# Patient Record
Sex: MALE
[De-identification: names, ages, dates, MRNs, and addresses within clinical notes are randomized per-mention and may not be internally consistent; named-entity substitution may affect disease eponyms.]

---

## 2023-03-17 PROBLEM — Z00.00 ENCOUNTER FOR PREVENTIVE HEALTH EXAMINATION: Status: ACTIVE | Noted: 2023-03-17

## 2023-04-03 ENCOUNTER — APPOINTMENT (OUTPATIENT)
Dept: UROLOGY | Facility: CLINIC | Age: 63
End: 2023-04-03

## 2023-04-24 ENCOUNTER — APPOINTMENT (OUTPATIENT)
Dept: UROLOGY | Facility: CLINIC | Age: 63
End: 2023-04-24
Payer: COMMERCIAL

## 2023-04-24 VITALS
SYSTOLIC BLOOD PRESSURE: 170 MMHG | HEART RATE: 60 BPM | DIASTOLIC BLOOD PRESSURE: 81 MMHG | HEIGHT: 67 IN | WEIGHT: 162 LBS | BODY MASS INDEX: 25.43 KG/M2 | RESPIRATION RATE: 16 BRPM

## 2023-04-24 DIAGNOSIS — I25.2 OLD MYOCARDIAL INFARCTION: ICD-10-CM

## 2023-04-24 DIAGNOSIS — E78.5 HYPERLIPIDEMIA, UNSPECIFIED: ICD-10-CM

## 2023-04-24 DIAGNOSIS — Z80.1 FAMILY HISTORY OF MALIGNANT NEOPLASM OF TRACHEA, BRONCHUS AND LUNG: ICD-10-CM

## 2023-04-24 DIAGNOSIS — N52.01 ERECTILE DYSFUNCTION DUE TO ARTERIAL INSUFFICIENCY: ICD-10-CM

## 2023-04-24 PROCEDURE — 99204 OFFICE O/P NEW MOD 45 MIN: CPT

## 2023-04-25 PROBLEM — I25.2 HISTORY OF MYOCARDIAL INFARCTION: Status: RESOLVED | Noted: 2023-04-25 | Resolved: 2023-04-25

## 2023-04-25 PROBLEM — Z80.1 FAMILY HISTORY OF LUNG CANCER: Status: ACTIVE | Noted: 2023-04-25

## 2023-04-25 PROBLEM — E78.5 HYPERLIPIDEMIA: Status: ACTIVE | Noted: 2023-04-25

## 2023-04-25 PROBLEM — N52.01 ERECTILE DYSFUNCTION DUE TO ARTERIAL INSUFFICIENCY: Status: ACTIVE | Noted: 2023-04-25

## 2023-04-25 RX ORDER — TADALAFIL 2.5 MG/1
TABLET, FILM COATED ORAL
Refills: 0 | Status: ACTIVE | COMMUNITY

## 2023-04-25 RX ORDER — ROSUVASTATIN CALCIUM 10 MG/1
10 TABLET, FILM COATED ORAL
Refills: 0 | Status: ACTIVE | COMMUNITY

## 2023-04-25 RX ORDER — PRASUGREL 10 MG/1
10 TABLET, FILM COATED ORAL
Refills: 0 | Status: ACTIVE | COMMUNITY

## 2023-04-25 NOTE — DISEASE MANAGEMENT
[1] : T1 [Biopsy] : Patient had a biopsy on [6] : Template Biopsy Brown Score: 6 [Biopsy results sent to PCP/Referring Physician] : Biopsy results sent to PCP/Referring Physician [] : Patient had no Prostate MRI performed [BiopsyDate] : 09/2021 [TotalCores] : 12 [TotalPositiveCores] : 2 [MaxCoreInvolvement] : 90% [I] : I [Active Surveillance] : Active Surveillance [LastPSADate] : 11/1/2022 [LastPSAResults] : 5.97 ng/mL [LastIDate] : 1/3/2023 [LastMRIResults] : 22 mL prostate volume, PIRAD 4 Right PZpl

## 2023-04-25 NOTE — HISTORY OF PRESENT ILLNESS
[FreeTextEntry1] : Referred by Dr. Menendez for fusion prostate biopsy.\par Diagnosed with Gr 1 prostate cancer in Sept 2021.  Currently on active surveillance.\par MRI prostate January 2023 demonstrated PIRAD 4 lesion.\par

## 2023-05-05 ENCOUNTER — NON-APPOINTMENT (OUTPATIENT)
Age: 63
End: 2023-05-05

## 2023-05-12 LAB
PSA FREE FLD-MCNC: 17 %
PSA FREE SERPL-MCNC: 1.2 NG/ML
PSA SERPL-MCNC: 6.91 NG/ML

## 2023-05-16 ENCOUNTER — APPOINTMENT (OUTPATIENT)
Dept: UROLOGY | Facility: CLINIC | Age: 63
End: 2023-05-16

## 2023-05-24 ENCOUNTER — NON-APPOINTMENT (OUTPATIENT)
Age: 63
End: 2023-05-24

## 2023-06-06 ENCOUNTER — OUTPATIENT (OUTPATIENT)
Dept: OUTPATIENT SERVICES | Facility: HOSPITAL | Age: 63
LOS: 1 days | End: 2023-06-06
Payer: COMMERCIAL

## 2023-06-06 ENCOUNTER — APPOINTMENT (OUTPATIENT)
Dept: UROLOGY | Facility: CLINIC | Age: 63
End: 2023-06-06
Payer: COMMERCIAL

## 2023-06-06 VITALS
TEMPERATURE: 97.5 F | HEART RATE: 56 BPM | RESPIRATION RATE: 16 BRPM | SYSTOLIC BLOOD PRESSURE: 158 MMHG | DIASTOLIC BLOOD PRESSURE: 81 MMHG

## 2023-06-06 DIAGNOSIS — R35.0 FREQUENCY OF MICTURITION: ICD-10-CM

## 2023-06-06 PROCEDURE — 76942 ECHO GUIDE FOR BIOPSY: CPT | Mod: 26,59

## 2023-06-06 PROCEDURE — 76377 3D RENDER W/INTRP POSTPROCES: CPT | Mod: 26

## 2023-06-06 PROCEDURE — 76942 ECHO GUIDE FOR BIOPSY: CPT

## 2023-06-06 PROCEDURE — 55700: CPT | Mod: 22

## 2023-06-06 PROCEDURE — 55700: CPT

## 2023-06-07 ENCOUNTER — NON-APPOINTMENT (OUTPATIENT)
Age: 63
End: 2023-06-07

## 2023-06-13 ENCOUNTER — NON-APPOINTMENT (OUTPATIENT)
Age: 63
End: 2023-06-13

## 2023-06-19 DIAGNOSIS — C61 MALIGNANT NEOPLASM OF PROSTATE: ICD-10-CM

## 2023-06-21 LAB — PROSTATE BIOPSY: NORMAL

## 2023-07-03 ENCOUNTER — OUTPATIENT (OUTPATIENT)
Dept: OUTPATIENT SERVICES | Facility: HOSPITAL | Age: 63
LOS: 1 days | Discharge: HOME | End: 2023-07-03
Payer: COMMERCIAL

## 2023-07-04 RX ORDER — DIAZEPAM 5 MG/1
5 TABLET ORAL
Qty: 1 | Refills: 0 | Status: DISCONTINUED | COMMUNITY
Start: 2023-05-05 | End: 2023-07-04

## 2023-07-06 ENCOUNTER — NON-APPOINTMENT (OUTPATIENT)
Age: 63
End: 2023-07-06

## 2023-07-06 ENCOUNTER — APPOINTMENT (OUTPATIENT)
Dept: RADIATION ONCOLOGY | Facility: CLINIC | Age: 63
End: 2023-07-06
Payer: COMMERCIAL

## 2023-07-06 VITALS
HEART RATE: 53 BPM | BODY MASS INDEX: 25.37 KG/M2 | WEIGHT: 162 LBS | SYSTOLIC BLOOD PRESSURE: 160 MMHG | OXYGEN SATURATION: 100 % | TEMPERATURE: 97.52 F | DIASTOLIC BLOOD PRESSURE: 74 MMHG | RESPIRATION RATE: 16 BRPM

## 2023-07-06 DIAGNOSIS — Z80.42 FAMILY HISTORY OF MALIGNANT NEOPLASM OF PROSTATE: ICD-10-CM

## 2023-07-06 DIAGNOSIS — U07.1 COVID-19: ICD-10-CM

## 2023-07-06 DIAGNOSIS — Z78.9 OTHER SPECIFIED HEALTH STATUS: ICD-10-CM

## 2023-07-06 DIAGNOSIS — Z23 ENCOUNTER FOR IMMUNIZATION: ICD-10-CM

## 2023-07-06 PROCEDURE — 99204 OFFICE O/P NEW MOD 45 MIN: CPT | Mod: 25

## 2023-07-06 NOTE — VITALS
[Maximal Pain Intensity: 0/10] : 0/10 [Least Pain Intensity: 0/10] : 0/10 [NoTreatment Scheduled] : no treatment scheduled [90: Able to carry normal activity; minor signs or symptoms of disease.] : 90: Able to carry normal activity; minor signs or symptoms of disease.  [ECOG Performance Status: 0 - Fully active, able to carry on all pre-disease performance without restriction] : Performance Status: 0 - Fully active, able to carry on all pre-disease performance without restriction [Date: ____________] : Patient's last distress assessment performed on [unfilled]. [0 - No Distress] : Distress Level: 0 [Patient given social work contact information and resource sheet] : Patient was given social work contact information and resource sheet

## 2023-07-06 NOTE — PHYSICAL EXAM
[Sclera] : the sclera and conjunctiva were normal [Outer Ear] : the ears and nose were normal in appearance [Normal External Genitalia] : normal external genitalia without lesions [Normal] : oriented to person, place and time, the affect was normal, the mood was normal and not anxious

## 2023-07-24 ENCOUNTER — APPOINTMENT (OUTPATIENT)
Dept: UROLOGY | Facility: CLINIC | Age: 63
End: 2023-07-24
Payer: COMMERCIAL

## 2023-07-24 VITALS
HEART RATE: 53 BPM | HEIGHT: 67 IN | RESPIRATION RATE: 16 BRPM | SYSTOLIC BLOOD PRESSURE: 150 MMHG | WEIGHT: 163 LBS | DIASTOLIC BLOOD PRESSURE: 66 MMHG | BODY MASS INDEX: 25.58 KG/M2

## 2023-07-24 PROCEDURE — 99214 OFFICE O/P EST MOD 30 MIN: CPT

## 2023-07-24 NOTE — HISTORY OF PRESENT ILLNESS
[FreeTextEntry1] : Urinary function: Stream is good, denies any increased frequency or urgency. Denies any hematuria, dysuria or incontinence. \par \par Sexual function: Using Tadalafil occasionally\par \par History of PPM placed 2012, history of cardiac stents last stent about 5 years ago. Currently on Prasugrel

## 2023-07-24 NOTE — DISEASE MANAGEMENT
[0-10] : 0 -10 ng/mL [Biopsy with Fusion] : Patient had a biopsy with fusion on [7(3+4)] : Fusion Biopsy [place] Score: 7(3+4) [] : Patient had a Prostate MRI [4] : 4 [1] : T1 [c] : c [0] : N0 [X] : MX [Biopsy results sent to PCP/Referring Physician] : Biopsy results sent to PCP/Referring Physician [BiopsyDate] : 6/6/23 [MeasuredProstateVolume] : 22 [TotalCores] : 13 [TotalPositiveCores] : 7 [MaxCoreInvolvement] : 90 [IIB] : IIB

## 2023-07-31 ENCOUNTER — NON-APPOINTMENT (OUTPATIENT)
Age: 63
End: 2023-07-31

## 2023-08-04 ENCOUNTER — NON-APPOINTMENT (OUTPATIENT)
Age: 63
End: 2023-08-04

## 2023-08-04 ENCOUNTER — APPOINTMENT (OUTPATIENT)
Dept: NUCLEAR MEDICINE | Facility: IMAGING CENTER | Age: 63
End: 2023-08-04
Payer: COMMERCIAL

## 2023-08-04 ENCOUNTER — OUTPATIENT (OUTPATIENT)
Dept: OUTPATIENT SERVICES | Facility: HOSPITAL | Age: 63
LOS: 1 days | End: 2023-08-04
Payer: COMMERCIAL

## 2023-08-04 DIAGNOSIS — C61 MALIGNANT NEOPLASM OF PROSTATE: ICD-10-CM

## 2023-08-04 PROCEDURE — A9561: CPT

## 2023-08-04 PROCEDURE — 78306 BONE IMAGING WHOLE BODY: CPT | Mod: 26

## 2023-08-04 PROCEDURE — 78306 BONE IMAGING WHOLE BODY: CPT

## 2023-08-09 ENCOUNTER — NON-APPOINTMENT (OUTPATIENT)
Age: 63
End: 2023-08-09

## 2023-08-11 ENCOUNTER — NON-APPOINTMENT (OUTPATIENT)
Age: 63
End: 2023-08-11

## 2023-08-15 PROCEDURE — 77263 THER RADIOLOGY TX PLNG CPLX: CPT

## 2023-08-15 NOTE — HISTORY OF PRESENT ILLNESS
[FreeTextEntry1] : Mr. Muñiz is a 63 year old male who presents in consultation for consideration of radiation therapy.  He is unaccompanied for today's visit. \par \par Diagnosis: Intermediate Risk Adenocarcinoma of Prostate in 7/13 cores. [place] score 3+4=7 in 2/13 cores and [place] score 3+3=6 in 5/13 cores.  5 - 90% max involvement.  PSA 6.91 ng/mL.  MRI T2, No JORGE LUIS or SV involvement. \par \par **Patient has a Medtronic PACEMAKER/DEFIBRILLATOR and is on blood thinner.** \par \par PSA Trend:\par 2021 -   5.2 ng/ mL\par 4/5/22 - 5.53\par 11/1/22 - 5.97\par 4/24/23 - 6.91 \par \par \par HPI : \par Patient was originally diagnosed with Prostate Cancer, [place] score 6, in September 2021 and was on active surveillance with Dr. KELSEY Menendez (Adv. Urology).  \par \par 4/4/22 - MRI Pelvis:  Prostate volume 27.9 cc.  A  4 x 3 mm right, posterior medial, midgland, peripheral zone lesion is seen.  No JORGE LUIS, SV invasion or lymphadenopathy seen.  PIRADS 4 \par \par 5/2022 - Dr. Menendez (Adv. Urology) had recommended a biopsy to further evaluate due to PIRADS 4 lesion on MRI ... as per note patient canceled the biopsy and was lost to follow up after seeking a second opinion. \par \par 11/1/22 - returned to see Dr. Menendez (Adv. Urology) new MRI ordered and again strongly advised to have prostate biopsy after MRI.  \par \par 1/3/23 - MRI Pelvis:  Prostate volume 22 cc. Grossly stable right lower posterolateral mid gland peripheral zone lesion, 6.3 x 3.9 mm. Moderate to highly suspicious for neoplasm, tissue sampling is recommended.  No JORGE LUIS, SV invasion, or lymphadenopathy seen.  PIRADS 4\par \par 4/24/23 - saw Dr. Tom (urology) in consultation for prostate biopsy.  \par \par 6/6/23 - underwent Prostate Biopsy:  Pathology - Adenocarcinoma of Prostate in 7/13 cores. [place] score 3+4=7 in 2/13 cores and [place] score 3+3=6 in 5/13 cores.  5 - 90% max involvement. \par \par 6/13/23 -  Biopsy results discussed with patient by Dr. Tom (urology) ..  patient to follow up in office to discuss treatment options and referral to radiation oncology made. \par \par 7/6/23 - presents in consultation for discussion of radiation therapy options.  Mr. Muñiz is feeling well today with no complaints.  He has minimal urinary symptoms and denies any bowel issues.  IPSS 0 / QOL 1 / EPIC 1  To follow up with Dr. Tom (urology) on 7/24/23.  Has Medtronic Pacemaker/Defibrillator - last interrogation done about 6 months ago.

## 2023-08-15 NOTE — DISEASE MANAGEMENT
[0-10] : 0 -10 ng/mL [Biopsy with Fusion] : Patient had a biopsy with fusion on [7(3+4)] : Fusion Biopsy [place] Score: 7(3+4) [] : Patient had a Prostate MRI [4] : 4 [II] : II [BiopsyDate] : 6/6/23 [MeasuredProstateVolume] : 22 [TotalCores] : 13 [TotalPositiveCores] : 7 [MaxCoreInvolvement] : 90

## 2023-08-15 NOTE — REVIEW OF SYSTEMS
[Patient Intake Form Reviewed] : Patient intake form was reviewed [IPSS Score (0-40): ___] : IPSS score: [unfilled] [EPIC-CP Score (0-60): ___] : EPIC-CP score: [unfilled] [Negative] : Heme/Lymph [Constipation: Grade 0] : Constipation: Grade 0 [Diarrhea: Grade 0] : Diarrhea: Grade 0 [Hematuria: Grade 0] : Hematuria: Grade 0 [Urinary Incontinence: Grade 0] : Urinary Incontinence: Grade 0  [Urinary Retention: Grade 0] : Urinary Retention: Grade 0 [Urinary Tract Pain: Grade 0] : Urinary Tract Pain: Grade 0 [Urinary Urgency: Grade 0] : Urinary Urgency: Grade 0 [Urinary Frequency: Grade 0] : Urinary Frequency: Grade 0 [Ejaculation Disorder: Grade 0] : Ejaculation Disorder: Grade 0 [Erectile Dysfunction: Grade 0] : Erectile Dysfunction: Grade 0 [Chest Pain] : no chest pain [Palpitations] : no palpitations [Lower Ext Edema] : no lower extremity edema [FreeTextEntry3] : wears glasses  [FreeTextEntry5] : has Medtronic Pacemaker/Defibrillator  [FreeTextEntry7] : takes Tadalafil

## 2023-08-16 ENCOUNTER — NON-APPOINTMENT (OUTPATIENT)
Age: 63
End: 2023-08-16

## 2023-08-22 ENCOUNTER — NON-APPOINTMENT (OUTPATIENT)
Age: 63
End: 2023-08-22

## 2023-08-22 PROCEDURE — 55874 TPRNL PLMT BIODEGRDABL MATRL: CPT

## 2023-08-22 PROCEDURE — 76872 US TRANSRECTAL: CPT | Mod: 26

## 2023-08-22 NOTE — PROCEDURE
[FreeTextEntry1] : SpaceOAR and Fiducial Markers in Prostate [FreeTextEntry2] : Prostate Cancer [FreeTextEntry3] :  In preparation for the procedure, he self-administered an enema one hour before leaving home and was NPO the night before procedure. He was prescribed a 3 days course of oral antibiotics twice daily to be started a day prior to the procedure.  He held anti-coagulation x 5 days. Topical JOVANI cream was applied to the perineal area one hour prior to procedure. Patient was prescribed and took Tylenol 650 mg upon arrival in the department 40 minutes prior to procedure.     Procedure risk and benefits were reviewed with patient and a written consent was obtained prior to procedure. A time out was observed with patient name, date of birth, procedure, position, and site verified.     Patient was placed in a lithotomy position. Chloral prep was used to prep the skin. While maintaining aseptic technique, an ultrasound probe was inserted into the rectum to visualize the prostate. Less than 10 cc of Lidocaine 2% and sodium bicarbonate 8.4% was injected subcutaneously. Afterwards, 20 cc of Lidocaine and sodium bicarbonate was injected internally at the prostate apex and bilateral neurovascular bundles for the nerve block. Three fiducial markers were prepared on the sterile field. One fiducial marker was placed into each of the following sites: left lobe base, right lobe base, and apex, via 17 gauge needles under ultrasound guidance. Next, the hydrogel spacer kit was opened onto the sterile field and the hydrogel injection apparatus was prepared. An 18 gauge needle was positioned into the mid-line perirectal fat between the anterior rectal wall and prostate under ultrasound guidance. Less than 10 cc of saline was injected via the needle to hydrodissect the space and confirm proper placement in both axial and sagittal views. The syringe was aspirated to confirm the needle was extravascular. The syringe was replaced with the hydrogel injection apparatus and the gel was injected over about 10-15 seconds. The needle was then removed. There was minimal blood loss. The patient tolerated procedure well.     Patient was transferred to the recovery area on a monitor.  VSS. Tolerated fluid and a snack by mouth and was made comfortable. He denied pain. Post procedure instructions were given and reviewed with patient. CT/SIM appointment was given.

## 2023-08-23 ENCOUNTER — NON-APPOINTMENT (OUTPATIENT)
Age: 63
End: 2023-08-23

## 2023-08-29 PROCEDURE — 77263 THER RADIOLOGY TX PLNG CPLX: CPT

## 2023-08-30 ENCOUNTER — NON-APPOINTMENT (OUTPATIENT)
Age: 63
End: 2023-08-30

## 2023-08-30 PROCEDURE — 77290 THER RAD SIMULAJ FIELD CPLX: CPT | Mod: 26

## 2023-08-30 PROCEDURE — 77334 RADIATION TREATMENT AID(S): CPT | Mod: 26

## 2023-09-06 ENCOUNTER — OUTPATIENT (OUTPATIENT)
Dept: OUTPATIENT SERVICES | Facility: HOSPITAL | Age: 63
LOS: 1 days | Discharge: HOME | End: 2023-09-06
Payer: COMMERCIAL

## 2023-09-06 PROCEDURE — 77300 RADIATION THERAPY DOSE PLAN: CPT | Mod: 26

## 2023-09-06 PROCEDURE — 77334 RADIATION TREATMENT AID(S): CPT | Mod: 26

## 2023-09-06 PROCEDURE — 77295 3-D RADIOTHERAPY PLAN: CPT | Mod: 26

## 2023-10-02 DIAGNOSIS — Z92.3 PERSONAL HISTORY OF IRRADIATION: ICD-10-CM

## 2023-10-02 RX ORDER — SODIUM PHOSPHATE, DIBASIC AND SODIUM PHOSPHATE, MONOBASIC 7; 19 G/230ML; G/230ML
ENEMA RECTAL
Qty: 1 | Refills: 0 | Status: DISCONTINUED | COMMUNITY
Start: 2023-08-10 | End: 2023-10-02

## 2023-10-02 RX ORDER — AMOXICILLIN AND CLAVULANATE POTASSIUM 875; 125 MG/1; MG/1
875-125 TABLET, COATED ORAL
Qty: 6 | Refills: 0 | Status: DISCONTINUED | COMMUNITY
Start: 2023-08-10 | End: 2023-10-02

## 2023-10-05 DIAGNOSIS — R30.0 DYSURIA: ICD-10-CM

## 2023-10-06 PROCEDURE — 77435 SBRT MANAGEMENT: CPT

## 2023-10-20 ENCOUNTER — NON-APPOINTMENT (OUTPATIENT)
Age: 63
End: 2023-10-20

## 2023-11-06 ENCOUNTER — APPOINTMENT (OUTPATIENT)
Dept: RADIATION ONCOLOGY | Facility: CLINIC | Age: 63
End: 2023-11-06
Payer: COMMERCIAL

## 2023-11-06 VITALS
OXYGEN SATURATION: 100 % | DIASTOLIC BLOOD PRESSURE: 66 MMHG | TEMPERATURE: 96.3 F | RESPIRATION RATE: 17 BRPM | HEIGHT: 67 IN | HEART RATE: 55 BPM | BODY MASS INDEX: 25.9 KG/M2 | WEIGHT: 165 LBS | SYSTOLIC BLOOD PRESSURE: 150 MMHG

## 2023-11-06 DIAGNOSIS — C61 MALIGNANT NEOPLASM OF PROSTATE: ICD-10-CM

## 2023-11-06 PROCEDURE — 99214 OFFICE O/P EST MOD 30 MIN: CPT

## 2023-11-06 PROCEDURE — 99204 OFFICE O/P NEW MOD 45 MIN: CPT

## 2023-11-06 RX ORDER — PHENAZOPYRIDINE 200 MG/1
200 TABLET, FILM COATED ORAL 3 TIMES DAILY
Qty: 90 | Refills: 0 | Status: DISCONTINUED | COMMUNITY
Start: 2023-10-05 | End: 2023-11-06

## 2023-11-07 LAB — PSA SERPL-MCNC: 4.86 NG/ML

## 2024-05-10 ENCOUNTER — NON-APPOINTMENT (OUTPATIENT)
Age: 64
End: 2024-05-10

## 2024-05-10 ENCOUNTER — APPOINTMENT (OUTPATIENT)
Dept: RADIATION ONCOLOGY | Facility: CLINIC | Age: 64
End: 2024-05-10
Payer: COMMERCIAL

## 2024-05-10 VITALS
HEIGHT: 67 IN | RESPIRATION RATE: 16 BRPM | SYSTOLIC BLOOD PRESSURE: 174 MMHG | OXYGEN SATURATION: 100 % | DIASTOLIC BLOOD PRESSURE: 80 MMHG | TEMPERATURE: 96.98 F | WEIGHT: 162 LBS | HEART RATE: 66 BPM | BODY MASS INDEX: 25.43 KG/M2

## 2024-05-10 PROCEDURE — G2211 COMPLEX E/M VISIT ADD ON: CPT

## 2024-05-10 PROCEDURE — 99213 OFFICE O/P EST LOW 20 MIN: CPT

## 2024-05-10 NOTE — DISEASE MANAGEMENT
[0-10] : 0 -10 ng/mL [Biopsy with Fusion] : Patient had a biopsy with fusion on [7(3+4)] : Fusion Biopsy [place] Score: 7(3+4) [] : Patient had a Prostate MRI [4] : 4 [II] : II [Radiation Therapy] : Radiation Therapy [Treatment with radiation therapy] : Treatment with radiation therapy [EBRT] : EBRT [Pathological] : TNM Stage: p [BiopsyDate] : 6/6/23 [MeasuredProstateVolume] : 22 [TotalCores] : 13 [TotalPositiveCores] : 7 [MaxCoreInvolvement] : 90 [RadiationCompletedDate] : 10/6/2023 [EBRTDose] : 4000cGy [EBRTFractions] : 5 [TTNM] : 2 [NTNM] : 0 [MTNM] : 0

## 2024-05-10 NOTE — REVIEW OF SYSTEMS
[Urinary Frequency] : urinary frequency [IPSS Score (0-40): ___] : IPSS score: [unfilled] [EPIC-CP Score (0-60): ___] : EPIC-CP score: [unfilled] [Negative] : Heme/Lymph [Constipation: Grade 0] : Constipation: Grade 0 [Diarrhea: Grade 0] : Diarrhea: Grade 0 [Rectal Pain: Grade 0] : Rectal Pain: Grade 0 [Hematuria: Grade 0] : Hematuria: Grade 0 [Urinary Incontinence: Grade 0] : Urinary Incontinence: Grade 0  [Urinary Retention: Grade 0] : Urinary Retention: Grade 0 [Urinary Tract Pain: Grade 0] : Urinary Tract Pain: Grade 0 [Urinary Urgency: Grade 0] : Urinary Urgency: Grade 0 [Urinary Frequency: Grade 1 - Present] : Urinary Frequency: Grade 1 - Present [Nocturia] : no nocturia [FreeTextEntry8] : QOL 2 [FreeTextEntry5] : occasional  (4) no limitation

## 2024-05-10 NOTE — HISTORY OF PRESENT ILLNESS
[FreeTextEntry1] : Mr. Muiñz is a 64-year-old male who presents for a follow up visit. He is unaccompanied for today's visit.    Diagnosis: Intermediate Risk Adenocarcinoma of Prostate in 7/13 cores. [place] score 3+4=7 in 2/13 cores and [place] score 3+3=6 in 5/13 cores.  5 - 90% max involvement.  PSA 6.91 ng/mL.  MRI T2, No JORGE LUIS or SV involvement.   TREATMENT: 9/27/23 - 10/6/23: Received RADIATION Therapy to Prostate, 4000 cGy in 5 fx   **Patient has a Medtronic PACEMAKER/DEFIBRILLATOR and is on blood thinner. **   PSA Trend: 2021 -   5.2 ng/ mL 4/5/22 - 5.53 11/1/22 - 5.97 4/24/23 - 6.91  11/6/23 - 4.86   HPI:  Patient was originally diagnosed with Prostate Cancer, [place] score 6, in September 2021 and was on active surveillance with Dr. KELSEY Menendez (Adv[facility] Urology).    4/4/22 - MRI Pelvis:  Prostate volume 27.9 cc.  A 4 x 3 mm right, posterior medial, midgland, peripheral zone lesion is seen.  No JORGE LUIS, SV invasion or lymphadenopathy seen.  PIRADS 4   5/2022 - Dr. Menendez (Adv. Urology) had recommended a biopsy to further evaluate due to PIRADS 4 lesion on MRI ... as per note patient canceled the biopsy and was lost to follow up after seeking a second opinion.   11/1/22 - returned to see Dr. Menendez (Adv. Urology) new MRI ordered and again strongly advised to have prostate biopsy after MRI.    1/3/23 - MRI Pelvis:  Prostate volume 22 cc. Grossly stable right lower posterolateral mid gland peripheral zone lesion, 6.3 x 3.9 mm. Moderate to highly suspicious for neoplasm, tissue sampling is recommended.  No JORGE LUIS, SV invasion, or lymphadenopathy seen.  PIRADS 4  4/24/23 - saw Dr. Tom (urology) in consultation for prostate biopsy.    6/6/23 - underwent Prostate Biopsy:  Pathology - Adenocarcinoma of Prostate in 7/13 cores. [place] score 3+4=7 in 2/13 cores and [place] score 3+3=6 in 5/13 cores.  5 - 90% max involvement.   6/13/23 - Biopsy results discussed with patient by Dr. Tom (urology) ...  patient to follow up in office to discuss treatment options and referral to radiation oncology made.   7/6/23 - presented in consultation for discussion of radiation therapy options.  Mr. Muñiz is feeling well today with no complaints.  He has minimal urinary symptoms and denies any bowel issues.  IPSS 0 / QOL 1 / EPIC 1 To follow up with Dr. Tom (urology) on 7/24/23.  Has Medtronic Pacemaker/Defibrillator - last interrogation done about 6 months ago.  Discussed the results of his imaging and pathology to date. Recommended treatment at this time, due to progression on active surveillance. We have discussed treatment options including EBRT to pelvis and prostate, EBRT to prostate, SBRT to prostate and brachytherapy. We have discussed the possibility of use of 6 months of androgen deprivation treatment as well as the use of the SpaceOAR and fiducial markers to protect the rectum and guide daily treatment. We have also discussed the logistics and common and rare acute and late side effects from prostate/pelvic radiation in detail. I have recommended routine colonoscopy prior to radiation treatment. He has had written information to take away today. He will see Dr Tom to discuss surgery and will let me know how he wishes to proceed once he has seen Dr Tom. I have requested a bone scan to complete staging in the meantime. 8/15/23: discussed radiation protocol with patient by phone. He has opted for moderate hypofractionation, no ADT.  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 No definitive evidence of bone metastases.  9/27/23 - 10/6/23: Received RADIATION Therapy to Prostate, 4000 cGy in 5 fx   11/6/23 - presented for post treatment evaluation appointment.  Mr. Muñiz has been doing well since completing his radiation therapy.  He states that he has noticed a bit of an increase in urinary frequency and some urgency. No bowel issues. IPSS 7 / QOL 1 / EPIC 5 He will follow up with urologist, Dr. Menendez (Adv[facility] Urology) as needed. Normal sexual function. FU 6m. PSA taken today.  5/10/24 - presents for follow up visit.   Mr. Muñiz is feeling well today.  He complains of urinary frequency during the day, denies dysuria or hematuria, also no nocturia.  No bowel issues.  Continues to use Tadalafil for ED.  IPSS 5 / QOL 2 EPIC-CP 6

## 2024-05-21 LAB — PSA SERPL-MCNC: 1.52 NG/ML
